# Patient Record
Sex: FEMALE | Race: ASIAN | NOT HISPANIC OR LATINO | Employment: FULL TIME | URBAN - METROPOLITAN AREA
[De-identification: names, ages, dates, MRNs, and addresses within clinical notes are randomized per-mention and may not be internally consistent; named-entity substitution may affect disease eponyms.]

---

## 2017-10-16 ENCOUNTER — OFFICE VISIT (OUTPATIENT)
Dept: FAMILY MEDICINE | Facility: CLINIC | Age: 27
End: 2017-10-16
Payer: COMMERCIAL

## 2017-10-16 VITALS
OXYGEN SATURATION: 100 % | HEIGHT: 59 IN | SYSTOLIC BLOOD PRESSURE: 99 MMHG | TEMPERATURE: 97.9 F | DIASTOLIC BLOOD PRESSURE: 64 MMHG | WEIGHT: 104 LBS | HEART RATE: 73 BPM | BODY MASS INDEX: 20.96 KG/M2

## 2017-10-16 DIAGNOSIS — Z12.4 SCREENING FOR MALIGNANT NEOPLASM OF CERVIX: ICD-10-CM

## 2017-10-16 DIAGNOSIS — R14.1 FLATULENCE, ERUCTATION, AND GAS PAIN: ICD-10-CM

## 2017-10-16 DIAGNOSIS — R14.3 FLATULENCE, ERUCTATION, AND GAS PAIN: ICD-10-CM

## 2017-10-16 DIAGNOSIS — N94.6 DYSMENORRHEA: ICD-10-CM

## 2017-10-16 DIAGNOSIS — R14.2 FLATULENCE, ERUCTATION, AND GAS PAIN: ICD-10-CM

## 2017-10-16 DIAGNOSIS — Z00.01 ENCOUNTER FOR PREVENTATIVE ADULT HEALTH CARE EXAM WITH ABNORMAL FINDINGS: Primary | ICD-10-CM

## 2017-10-16 DIAGNOSIS — Z23 NEED FOR PROPHYLACTIC VACCINATION WITH TETANUS-DIPHTHERIA (TD): ICD-10-CM

## 2017-10-16 PROCEDURE — 99385 PREV VISIT NEW AGE 18-39: CPT | Mod: 25 | Performed by: FAMILY MEDICINE

## 2017-10-16 PROCEDURE — G0145 SCR C/V CYTO,THINLAYER,RESCR: HCPCS | Performed by: FAMILY MEDICINE

## 2017-10-16 PROCEDURE — 90471 IMMUNIZATION ADMIN: CPT | Performed by: FAMILY MEDICINE

## 2017-10-16 PROCEDURE — 90472 IMMUNIZATION ADMIN EACH ADD: CPT | Performed by: FAMILY MEDICINE

## 2017-10-16 PROCEDURE — 90715 TDAP VACCINE 7 YRS/> IM: CPT | Performed by: FAMILY MEDICINE

## 2017-10-16 PROCEDURE — 90686 IIV4 VACC NO PRSV 0.5 ML IM: CPT | Performed by: FAMILY MEDICINE

## 2017-10-16 NOTE — PROGRESS NOTES
SUBJECTIVE:   CC: Hardy Head is an 27 year old woman who presents for preventive health visit.     Physical   Annual:     Getting at least 3 servings of Calcium per day::  Yes    Bi-annual eye exam::  Yes    Dental care twice a year::  Yes    Sleep apnea or symptoms of sleep apnea::  None    Diet::  Regular (no restrictions)    Frequency of exercise::  1 day/week    Duration of exercise::  Other    Taking medications regularly::  Not Applicable    Additional concerns today::  No      Painful periods.  9/21/17, 8/21/17, 7/16/17, Mena nothing, 5/27/17, 4/27/17, 3/18/17  Can get dysmenorrhea with menstrual cycle, intermittent.  Not considering pregnancy at this time.    Constipation, anal fissure in past.   Stool softener prn.  Has daily bowel movements.   Notices bloating with rice  Burping after lunch.  This has been going on for awhile.  No problems after breakfast or dinner.    Can have trouble with insomnia when mind is racing    Low back pain with periods.          Today's PHQ-2 Score:   PHQ-2 ( 1999 Pfizer) 10/16/2017   Q1: Little interest or pleasure in doing things 0   Q2: Feeling down, depressed or hopeless 0   PHQ-2 Score 0   Q1: Little interest or pleasure in doing things Not at all   Q2: Feeling down, depressed or hopeless Not at all   PHQ-2 Score 0       Abuse: Current or Past(Physical, Sexual or Emotional)- No  Do you feel safe in your environment - Yes    Social History   Substance Use Topics     Smoking status: Never Smoker     Smokeless tobacco: Never Used     Alcohol use No     The patient does not drink >3 drinks per day nor >7 drinks per week.    Reviewed orders with patient.  Reviewed health maintenance and updated orders accordingly - Yes  There is no problem list on file for this patient.    Past Surgical History:   Procedure Laterality Date     NO HISTORY OF SURGERY         Social History   Substance Use Topics     Smoking status: Never Smoker     Smokeless tobacco: Never Used     Alcohol  "use No     History reviewed. No pertinent family history.          Mammogram not appropriate for this patient based on age.    Pertinent mammograms are reviewed under the imaging tab.  History of abnormal Pap smear: NO - age 21-29 PAP every 3 years recommended    Reviewed and updated as needed this visit by clinical staffTobacco  Allergies  Med Hx  Surg Hx  Fam Hx  Soc Hx        Reviewed and updated as needed this visit by Provider  Tobacco  Soc Hx             ROS:  C: NEGATIVE for fever, chills, change in weight  I: NEGATIVE for worrisome rashes, moles or lesions  E: NEGATIVE for vision changes or irritation  ENT: NEGATIVE for ear, mouth and throat problems  R: NEGATIVE for significant cough or SOB  B: NEGATIVE for masses, tenderness or discharge  CV: NEGATIVE for chest pain, palpitations or peripheral edema  GI: see above  : NEGATIVE for unusual urinary or vaginal symptoms. Periods are regular.   female: see above  M: NEGATIVE for significant arthralgias or myalgia  N: NEGATIVE for weakness, dizziness or paresthesias  P: NEGATIVE for changes in mood or affect     OBJECTIVE:   BP 99/64  Pulse 73  Temp 97.9  F (36.6  C) (Oral)  Ht 4' 11.37\" (1.508 m)  Wt 104 lb (47.2 kg)  LMP 09/21/2017 (Exact Date)  SpO2 100%  BMI 20.74 kg/m2  EXAM:  GENERAL: healthy, alert and no distress  EYES: Eyes grossly normal to inspection, PERRL and conjunctivae and sclerae normal  HENT: ear canals and TM's normal, nose and mouth without ulcers or lesions  NECK: no adenopathy, no asymmetry, masses, or scars and thyroid normal to palpation  RESP: lungs clear to auscultation - no rales, rhonchi or wheezes  BREAST: normal without masses, tenderness or nipple discharge and no palpable axillary masses or adenopathy  CV: regular rate and rhythm, normal S1 S2, no S3 or S4, no murmur, click or rub, no peripheral edema and peripheral pulses strong  ABDOMEN: soft, nontender, no hepatosplenomegaly, no masses and bowel sounds " "normal  :  Vagina and vulva are normal;  no discharge is noted.  Cervix normal without lesions. Uterus anteverted and mobile, normal in size and shape without tenderness.  Adnexa normal in size without masses or tenderness. Pap Smear - is completed today.  MS: no gross musculoskeletal defects noted, no edema  SKIN: no suspicious lesions or rashes  NEURO: Normal strength and tone, mentation intact and speech normal  PSYCH: mentation appears normal, affect normal/bright    ASSESSMENT/PLAN:   1. Encounter for preventative adult health care exam with abnormal findings    - FLU VAC, SPLIT VIRUS IM > 3 YO (QUADRIVALENT) [01791]  - Vaccine Administration, Initial [48262]  - Vaccine Administration, Each Additional [51242]    2. Flatulence, eructation, and gas pain  Patient instructions printed for her review regarding common reasons for this  Follow up with me if persistent    3. Dysmenorrhea  May use nsaids as needed  Symptoms are not monthly  reassurance    4. Need for prophylactic vaccination with tetanus-diphtheria (TD)    - TDAP VACCINE (ADACEL)    5. Screening for malignant neoplasm of cervix    - Pap imaged thin layer screen reflex to HPV if ASCUS - recommend age 25 - 29    COUNSELING:  Reviewed preventive health counseling, as reflected in patient instructions       Regular exercise       Healthy diet/nutrition       Contraception       Family planning         reports that she has never smoked. She has never used smokeless tobacco.    Estimated body mass index is 20.74 kg/(m^2) as calculated from the following:    Height as of this encounter: 4' 11.37\" (1.508 m).    Weight as of this encounter: 104 lb (47.2 kg).         Counseling Resources:  ATP IV Guidelines  Pooled Cohorts Equation Calculator  Breast Cancer Risk Calculator  FRAX Risk Assessment  ICSI Preventive Guidelines  Dietary Guidelines for Americans, 2010  USDA's MyPlate  ASA Prophylaxis  Lung CA Screening    Leann Virk MD  [facility]" BRIGHTONInjectable Influenza Immunization Documentation    1.  Is the person to be vaccinated sick today?   No    2. Does the person to be vaccinated have an allergy to a component   of the vaccine?   No    3. Has the person to be vaccinated ever had a serious reaction   to influenza vaccine in the past?   No    4. Has the person to be vaccinated ever had Guillain-Barré syndrome?   No    Form completed by Self

## 2017-10-16 NOTE — LETTER
October 20, 2017      Hardy Head  [address]  [place] [address]    Dear ,      I am happy to inform you that your recent cervical cancer screening test (PAP smear) was normal.      Preventative screenings such as this help to ensure your health for years to come. You should repeat a pap smear in 3 years, unless otherwise directed.      You will still need to return to the clinic every year for your annual exam and other preventive tests.     Please contact the clinic at [phone] if you have further questions.       Sincerely,      Leann Virk MD/[facility]

## 2017-10-16 NOTE — MR AVS SNAPSHOT
After Visit Summary   10/16/2017    Hardy Head    MRN: [id number]           Patient Information     Date Of Birth          1990        Visit Information        Provider Department      10/16/2017 6:00 PM Leann Virk MD [facility]        Today's Diagnoses     Encounter for preventative adult health care exam with abnormal findings    -  1    Flatulence, eructation, and gas pain        Dysmenorrhea        Need for prophylactic vaccination with tetanus-diphtheria (TD)        Screening for malignant neoplasm of cervix          Care Instructions        Preventive Health Recommendations  Female Ages 26 - 39  Yearly exam:   See your health care provider every year in order to    Review health changes.     Discuss preventive care.      Review your medicines if you your doctor has prescribed any.    Until age 30: Get a Pap test every three years (more often if you have had an abnormal result).    After age 30: Talk to your doctor about whether you should have a Pap test every 3 years or have a Pap test with HPV screening every 5 years.   You do not need a Pap test if your uterus was removed (hysterectomy) and you have not had cancer.  You should be tested each year for STDs (sexually transmitted diseases), if you're at risk.   Talk to your provider about how often to have your cholesterol checked.  If you are at risk for diabetes, you should have a diabetes test (fasting glucose).  Shots: Get a flu shot each year. Get a tetanus shot every 10 years.   Nutrition:     Eat at least 5 servings of fruits and vegetables each day.    Eat whole-grain bread, whole-wheat pasta and brown rice instead of white grains and rice.    Talk to your provider about Calcium and Vitamin D.     Lifestyle    Exercise at least 150 minutes a week (30 minutes a day, 5 days of the week). This will help you control your weight and prevent disease.    Limit alcohol to one drink per day.    No smoking.  "    Wear sunscreen to prevent skin cancer.    See your dentist every six months for an exam and cleaning.            Follow-ups after your visit        Who to contact     If you have questions or need follow up information about today's clinic visit or your schedule please contact [facility] directly at [phone].  Normal or non-critical lab and imaging results will be communicated to you by MyChart, letter or phone within 4 business days after the clinic has received the results. If you do not hear from us within 7 days, please contact the clinic through MyChart or phone. If you have a critical or abnormal lab result, we will notify you by phone as soon as possible.  Submit refill requests through [employer] or call your pharmacy and they will forward the refill request to us. Please allow 3 business days for your refill to be completed.          Additional Information About Your Visit        MyChart Information     [employer] lets you send messages to your doctor, view your test results, renew your prescriptions, schedule appointments and more. To sign up, go to [email or web address] . Click on \"Log in\" on the left side of the screen, which will take you to the Welcome page. Then click on \"Sign up Now\" on the right side of the page.     You will be asked to enter the access code listed below, as well as some personal information. Please follow the directions to create your username and password.     Your access code is: [email or web address]  Expires: 2018  7:18 PM     Your access code will  in 90 days. If you need help or a new code, please call your [facility] or [phone].        Care EveryWhere ID     This is your Care EveryWhere ID. This could be used by other organizations to access your [place] medical records  [id number]        Your Vitals Were     Pulse Temperature Height Last Period Pulse Oximetry BMI (Body Mass Index)    73 97.9  F (36.6  C) (Oral) 4' 11.37\" (1.508 m) " 09/21/2017 (Exact Date) 100% 20.74 kg/m2       Blood Pressure from Last 3 Encounters:   10/16/17 99/64   07/13/16 101/68    Weight from Last 3 Encounters:   10/16/17 104 lb (47.2 kg)              We Performed the Following     FLU VAC, SPLIT VIRUS IM > 3 YO (QUADRIVALENT) [09732]     Pap imaged thin layer screen reflex to HPV if ASCUS - recommend age 25 - 29     TDAP VACCINE (ADACEL)     Vaccine Administration, Each Additional [25559]     Vaccine Administration, Initial [09633]        Primary Care Provider    None Specified       No primary provider on file.        Equal Access to Services     [facility] : Hadtrang Vega, chrystal can, bronson delacruz, behzad lang . [facility] [phone].    ATENCIÓN: Si habla español, tiene a benson disposición servicios gratuitos de asistencia lingüística. Llame al [phone].    We comply with applicable federal civil rights laws and [place] laws. We do not discriminate on the basis of race, color, national origin, age, disability, sex, sexual orientation, or gender identity.            Thank you!     Thank you for choosing [facility]  for your care. Our goal is always to provide you with excellent care. Hearing back from our patients is one way we can continue to improve our services. Please take a few minutes to complete the written survey that you may receive in the mail after your visit with us. Thank you!             Your Updated Medication List - Protect others around you: Learn how to safely use, store and throw away your medicines at www.disposemymeds.org.          This list is accurate as of: 10/16/17  7:19 PM.  Always use your most recent med list.                   Brand Name Dispense Instructions for use Diagnosis    acetaminophen-caff-pyrilamine 500-60-15 MG Tabs per tablet    MIDOL MENSTRUAL     Take 2 tablets by mouth every 6 hours as needed for mild pain        ZYRTEC ALLERGY PO

## 2017-10-16 NOTE — PATIENT INSTRUCTIONS

## 2017-10-19 LAB
COPATH REPORT: NORMAL
PAP: NORMAL

## 2018-03-14 ENCOUNTER — OFFICE VISIT (OUTPATIENT)
Dept: FAMILY MEDICINE | Facility: CLINIC | Age: 28
End: 2018-03-14
Payer: COMMERCIAL

## 2018-03-14 VITALS
HEART RATE: 97 BPM | SYSTOLIC BLOOD PRESSURE: 98 MMHG | TEMPERATURE: 97 F | DIASTOLIC BLOOD PRESSURE: 64 MMHG | OXYGEN SATURATION: 98 % | WEIGHT: 104 LBS | BODY MASS INDEX: 20.74 KG/M2

## 2018-03-14 DIAGNOSIS — J30.2 CHRONIC SEASONAL ALLERGIC RHINITIS, UNSPECIFIED TRIGGER: Primary | ICD-10-CM

## 2018-03-14 DIAGNOSIS — Z13.6 CARDIOVASCULAR SCREENING; LDL GOAL LESS THAN 130: ICD-10-CM

## 2018-03-14 DIAGNOSIS — R14.3 FLATULENCE, ERUCTATION, AND GAS PAIN: ICD-10-CM

## 2018-03-14 DIAGNOSIS — R14.2 FLATULENCE, ERUCTATION, AND GAS PAIN: ICD-10-CM

## 2018-03-14 DIAGNOSIS — R14.1 FLATULENCE, ERUCTATION, AND GAS PAIN: ICD-10-CM

## 2018-03-14 DIAGNOSIS — Z83.3 FAMILY HISTORY OF DIABETES MELLITUS: ICD-10-CM

## 2018-03-14 LAB
BASOPHILS # BLD AUTO: 0 10E9/L (ref 0–0.2)
BASOPHILS NFR BLD AUTO: 0.7 %
CHOLEST SERPL-MCNC: 215 MG/DL
DIFFERENTIAL METHOD BLD: NORMAL
EOSINOPHIL # BLD AUTO: 0.5 10E9/L (ref 0–0.7)
EOSINOPHIL NFR BLD AUTO: 7.8 %
ERYTHROCYTE [DISTWIDTH] IN BLOOD BY AUTOMATED COUNT: 12.5 % (ref 10–15)
GLUCOSE SERPL-MCNC: 96 MG/DL (ref 70–99)
HCT VFR BLD AUTO: 39.6 % (ref 35–47)
HDLC SERPL-MCNC: 62 MG/DL
HGB BLD-MCNC: 13.3 G/DL (ref 11.7–15.7)
LDLC SERPL CALC-MCNC: 130 MG/DL
LYMPHOCYTES # BLD AUTO: 2.3 10E9/L (ref 0.8–5.3)
LYMPHOCYTES NFR BLD AUTO: 37.8 %
MCH RBC QN AUTO: 29.1 PG (ref 26.5–33)
MCHC RBC AUTO-ENTMCNC: 33.6 G/DL (ref 31.5–36.5)
MCV RBC AUTO: 87 FL (ref 78–100)
MONOCYTES # BLD AUTO: 0.8 10E9/L (ref 0–1.3)
MONOCYTES NFR BLD AUTO: 12.7 %
NEUTROPHILS # BLD AUTO: 2.5 10E9/L (ref 1.6–8.3)
NEUTROPHILS NFR BLD AUTO: 41 %
NONHDLC SERPL-MCNC: 153 MG/DL
PLATELET # BLD AUTO: 362 10E9/L (ref 150–450)
RBC # BLD AUTO: 4.57 10E12/L (ref 3.8–5.2)
TRIGL SERPL-MCNC: 116 MG/DL
WBC # BLD AUTO: 6.1 10E9/L (ref 4–11)

## 2018-03-14 PROCEDURE — 99214 OFFICE O/P EST MOD 30 MIN: CPT | Performed by: FAMILY MEDICINE

## 2018-03-14 PROCEDURE — 83516 IMMUNOASSAY NONANTIBODY: CPT | Mod: 59 | Performed by: FAMILY MEDICINE

## 2018-03-14 PROCEDURE — 85025 COMPLETE CBC W/AUTO DIFF WBC: CPT | Performed by: FAMILY MEDICINE

## 2018-03-14 PROCEDURE — 36415 COLL VENOUS BLD VENIPUNCTURE: CPT | Performed by: FAMILY MEDICINE

## 2018-03-14 PROCEDURE — 82947 ASSAY GLUCOSE BLOOD QUANT: CPT | Performed by: FAMILY MEDICINE

## 2018-03-14 PROCEDURE — 80061 LIPID PANEL: CPT | Performed by: FAMILY MEDICINE

## 2018-03-14 PROCEDURE — 83516 IMMUNOASSAY NONANTIBODY: CPT | Performed by: FAMILY MEDICINE

## 2018-03-14 RX ORDER — MONTELUKAST SODIUM 10 MG/1
10 TABLET ORAL AT BEDTIME
Qty: 30 TABLET | Refills: 1 | Status: SHIPPED | OUTPATIENT
Start: 2018-03-14

## 2018-03-14 NOTE — PROGRESS NOTES
SUBJECTIVE:   Hardy Head is a 27 year old female who presents to clinic today for the following health issues:    ALLERGIES    Cough spells, sneezing, runny nose, no SOB or wheezing.    Nasal plugging.    Gets this every year: April through May.    She is taking Zyrtec and not sure whether helping.      Duration: 1 week    Description:   Nasal congestion: YES  Sneezing: YES  Red, itchy eyes: no    Accompanying signs and symptoms: coughing    History (similar episodes/allergy testing): Has been going on every year around this time    Precipitating or alleviating factors: None    Therapies tried and outcome: Zyrtec ; unknown if working    Abdominal Bloating:   Usually occurs after eating and has a lot of gas  Had chronic constipation about 3-4 yrs ago; was given given laxative and changed her diet and getting better.  Bloating makes her feel sick; occurs especially with rice. Diary products do not bother her.    FHx DM:   Grandfather, uncle dad    Problem list and histories reviewed & adjusted, as indicated.  Additional history: as documented    There is no problem list on file for this patient.    Past Surgical History:   Procedure Laterality Date     NO HISTORY OF SURGERY         Social History   Substance Use Topics     Smoking status: Never Smoker     Smokeless tobacco: Never Used     Alcohol use No     No family history on file.        Reviewed and updated as needed this visit by clinical staff    ROS:  Constitutional, HEENT, cardiovascular, pulmonary, gi and gu systems are negative, except as otherwise noted.    OBJECTIVE:     BP 98/64  Pulse 97  Temp 97  F (36.1  C) (Oral)  Wt 104 lb (47.2 kg)  SpO2 98%  BMI 20.74 kg/m2  Body mass index is 20.74 kg/(m^2).  GENERAL: healthy, alert and no distress  ENT: Nasal congestion   NECK: no adenopathy and thyroid normal to palpation  RESP: lungs clear to auscultation - no rales, rhonchi or wheezes  CV: regular rate and rhythm, normal S1 S2, no S3 or S4, no  murmur, click or rub, no peripheral edema   ABDOMEN: soft, nontender, no masses and bowel sounds normal  MS: no gross musculoskeletal defects noted, no edema    Diagnostic Test Results:  none     ASSESSMENT/PLAN:     (J30.2) Chronic seasonal allergic rhinitis, unspecified trigger  (primary encounter diagnosis)  Comment: Discussed the nature of seasonal allergies on the retry Singulair given that Zyrtec has not given her adequate relief.  Also discussed following up with an allergy specialist to see if she needs an testing and for further recommendations.  Plan: montelukast (SINGULAIR) 10 MG tablet,         ALLERGY/ASTHMA ADULT REFERRAL    (R14.3,  R14.1,  R14.2) Flatulence, eructation, and gas pain  Comment: Will check for gluten sensitivity given that this gets worse with eating rice.  Plan: Tissue transglutaminase christiano IgA and IgG, CBC         with platelets differential    (Z13.6) CARDIOVASCULAR SCREENING; LDL GOAL LESS THAN 130  Comment: This is fasting we will also do a cholesterol check  Plan: Lipid panel reflex to direct LDL Fasting    (Z83.3) Family history of diabetes mellitus  Comment: Given strong family history of diabetes we will check a glucose level today  Plan: Glucose    Follow up in 1 month or sooner with concerns     Nirav Lundberg MD  [facility]

## 2018-03-14 NOTE — PATIENT INSTRUCTIONS
Seasonal Allergy  Seasonal allergy is also called hay fever. It may occur after a person is exposed to pollens released from grasses, weeds, trees and shrubs. This type of allergy occurs during the spring and summer when the pollen contacts the lining of the nose, eyes, eyelids, sinuses and throat. This causes histamine to be released from the tissues. Histamine causes itching and swelling. This may produce a watery discharge from the eyes or nose. Violent sneezing, nasal congestion, post-nasal drip, itching of the eyes, nose, throat and mouth, scratchy throat, and dry cough may also occur.  Home care  Seasonal allergy cannot be cured, but symptoms can be reduced by these measures:    Avoid or reduce exposure to the allergen as much as you can:      Stay indoors on windy days of pollen season.     Keep windows and doors closed. Use air conditioning instead in your home and car. This filters the air.    Change air conditioner filters often.    Take a shower, wash your hair, and change clothes after being outdoors.    Put on a NIOSH-rated 95 filter mask when working outdoors. Before going outside, take your allergy medicine as advised by your healthcare provider.    Decongestant pills and sprays reduce tissue swelling and watery discharge. Overuse of nasal decongestant sprays may make symptoms worse. Do not use these more often than recommended. Sometimes you can experience a rebound effect (symptoms worsen), when stopping them. Talk to your healthcare provider or pharmacist about these medicines before taking them, especially if you have high blood pressure or heart problems.     Antihistamines block the release of histamine during the allergic response. They work better when taken before symptoms develop. Unless a prescription antihistamine was prescribed, you can take over-the-counter antihistamines that do not cause drowsiness.  Ask your pharmacist for suggestions.    Steroid nasal sprays or oral steroids may  also be prescribed for more severe symptoms. These help to reduce the local inflammation that can add to the allergic response.    If you have asthma, pollen season may make your asthma symptoms worse. It is important that you use your asthma medicines as directed during this time to prevent or treat attacks. Some persons with asthma have asthma symptoms that get worse when they take antihistamines. This is due to the drying effect on the lungs. If you notice this, stop the antihistamines, drink extra fluids and notify your doctor.    If you have sinus congestion or drainage, a saline nasal rinse may give relief. A saline nasal rinse lessens the swelling and clears excess mucus. This allows sinuses to drain. Prepackaged kits are sold at most drug stores. These contain pre-mixed salt packets and an irrigation device.  Follow-up care  Follow up with your healthcare provider or as directed. If you have been referred to a specialist, make an appointment promptly.  When to seek medical advice  Call your healthcare provider for any of the following:    Facial, ear or sinus pain; colored drainage from the nose    Headaches    You have asthma and your asthma symptoms do not respond to the usual doses of your medicine    Cough with colored sputum (mucus)    Fever of 100.4 F (38 C) or higher, or as directed by the healthcare provider  Call 911 if any of these occur:    Trouble breathing or swallowing, wheezing    Hoarse voice, trouble speaking, or drooling    Confusion    Very drowsy or trouble awakening    Fainting or loss of consciousness    Rapid heart rate, or weak pulse    Low blood pressure    Feeling of doom    Nausea, vomiting, abdominal pain, diarrhea    Vomiting blood, or large amounts of blood in stool    Seizure    Cold, moist, or pale (blue in color) skin  Date Last Reviewed: 5/1/2017 2000-2017 [employer]. [address] [place] [address], [place] [address]. All rights reserved. This information is not  intended as a substitute for professional medical care. Always follow your healthcare professional's instructions.      [facility]    If you have any questions regarding to your visit please contact your care team:       Team [facility]:   Clinic Hours Telephone Number   Dr. Sarika Engel   7am-7pm  Monday - Thursday   7am-5pm  Fridays  [phone]  (Appointment scheduling available 24/7)    Questions about your Visit?   Team Line:  [phone]   Urgent Care - [place] - 11am-9pm Monday-Friday Saturday-Sunday- 9am-5pm   [place] - 5pm-9pm Monday-Friday Saturday-Sunday- 9am-5pm  [phone] - [facility]  [phone] - [place]       What options do I have for visits at the clinic other than the traditional office visit?  To expand how we care for you, many of our providers are utilizing electronic visits (e-visits) and telephone visits, when medically appropriate, for interactions with their patients rather than a visit in the clinic.   We also offer nurse visits for many medical concerns. Just like any other service, we will bill your insurance company for this type of visit based on time spent on the phone with your provider. Not all insurance companies cover these visits. Please check with your medical insurance if this type of visit is covered. You will be responsible for any charges that are not paid by your insurance.      E-visits via [employer]:  generally incur a $35.00 fee.  Telephone visits:  Time spent on the phone: *charged based on time that is spent on the phone in increments of 10 minutes. Estimated cost:   5-10 mins $30.00   11-20 mins. $59.00   21-30 mins. $85.00     Use [employer] (secure email communication and access to your chart) to send your primary care provider a message or make an appointment. Ask someone on your Team how to sign up for [employer].  For a Price Quote for your services, please  call our Consumer Price Line at [phone].  As always, Thank you for trusting us with your health care needs!    Discharged By: An

## 2018-03-14 NOTE — MR AVS SNAPSHOT
After Visit Summary   3/14/2018    Hardy Head    MRN: [id number]           Patient Information     Date Of Birth          1990        Visit Information        Provider Department      3/14/2018 7:20 AM Nirav Lundberg MD [facility]        Today's Diagnoses     Chronic seasonal allergic rhinitis, unspecified trigger    -  1    Flatulence, eructation, and gas pain        CARDIOVASCULAR SCREENING; LDL GOAL LESS THAN 130        Family history of diabetes mellitus          Care Instructions      Seasonal Allergy  Seasonal allergy is also called hay fever. It may occur after a person is exposed to pollens released from grasses, weeds, trees and shrubs. This type of allergy occurs during the spring and summer when the pollen contacts the lining of the nose, eyes, eyelids, sinuses and throat. This causes histamine to be released from the tissues. Histamine causes itching and swelling. This may produce a watery discharge from the eyes or nose. Violent sneezing, nasal congestion, post-nasal drip, itching of the eyes, nose, throat and mouth, scratchy throat, and dry cough may also occur.  Home care  Seasonal allergy cannot be cured, but symptoms can be reduced by these measures:    Avoid or reduce exposure to the allergen as much as you can:      Stay indoors on windy days of pollen season.     Keep windows and doors closed. Use air conditioning instead in your home and car. This filters the air.    Change air conditioner filters often.    Take a shower, wash your hair, and change clothes after being outdoors.    Put on a NIOSH-rated 95 filter mask when working outdoors. Before going outside, take your allergy medicine as advised by your healthcare provider.    Decongestant pills and sprays reduce tissue swelling and watery discharge. Overuse of nasal decongestant sprays may make symptoms worse. Do not use these more often than recommended. Sometimes you can experience a rebound  effect (symptoms worsen), when stopping them. Talk to your healthcare provider or pharmacist about these medicines before taking them, especially if you have high blood pressure or heart problems.     Antihistamines block the release of histamine during the allergic response. They work better when taken before symptoms develop. Unless a prescription antihistamine was prescribed, you can take over-the-counter antihistamines that do not cause drowsiness.  Ask your pharmacist for suggestions.    Steroid nasal sprays or oral steroids may also be prescribed for more severe symptoms. These help to reduce the local inflammation that can add to the allergic response.    If you have asthma, pollen season may make your asthma symptoms worse. It is important that you use your asthma medicines as directed during this time to prevent or treat attacks. Some persons with asthma have asthma symptoms that get worse when they take antihistamines. This is due to the drying effect on the lungs. If you notice this, stop the antihistamines, drink extra fluids and notify your doctor.    If you have sinus congestion or drainage, a saline nasal rinse may give relief. A saline nasal rinse lessens the swelling and clears excess mucus. This allows sinuses to drain. Prepackaged kits are sold at most drug stores. These contain pre-mixed salt packets and an irrigation device.  Follow-up care  Follow up with your healthcare provider or as directed. If you have been referred to a specialist, make an appointment promptly.  When to seek medical advice  Call your healthcare provider for any of the following:    Facial, ear or sinus pain; colored drainage from the nose    Headaches    You have asthma and your asthma symptoms do not respond to the usual doses of your medicine    Cough with colored sputum (mucus)    Fever of 100.4 F (38 C) or higher, or as directed by the healthcare provider  Call 911 if any of these occur:    Trouble breathing or  swallowing, wheezing    Hoarse voice, trouble speaking, or drooling    Confusion    Very drowsy or trouble awakening    Fainting or loss of consciousness    Rapid heart rate, or weak pulse    Low blood pressure    Feeling of doom    Nausea, vomiting, abdominal pain, diarrhea    Vomiting blood, or large amounts of blood in stool    Seizure    Cold, moist, or pale (blue in color) skin  Date Last Reviewed: 5/1/2017 2000-2017 [employer]. [address] [place] [address]. All rights reserved. This information is not intended as a substitute for professional medical care. Always follow your healthcare professional's instructions.      [facility]    If you have any questions regarding to your visit please contact your care team:       Team Purple:   Clinic Hours Telephone Number   Dr. Sarika Engel   7am-7pm  Monday - Thursday   7am-5pm  Fridays  [phone]  (Appointment scheduling available 24/7)    Questions about your Visit?   Team Line:  [phone]   [facility] [place] [facility] - 11am-9pm Monday-Friday Saturday-Sunday- 9am-5pm   [place] - 5pm-9pm Monday-Friday Saturday-Sunday- 9am-5pm  [phone] - [place]   [phone] - [place]       What options do I have for visits at the clinic other than the traditional office visit?  To expand how we care for you, many of our providers are utilizing electronic visits (e-visits) and telephone visits, when medically appropriate, for interactions with their patients rather than a visit in the clinic.   We also offer nurse visits for many medical concerns. Just like any other service, we will bill your insurance company for this type of visit based on time spent on the phone with your provider. Not all insurance companies cover these visits. Please check with your medical insurance if this type of visit is covered. You will be responsible for  any charges that are not paid by your insurance.      E-visits via [employer]:  generally incur a $35.00 fee.  Telephone visits:  Time spent on the phone: *charged based on time that is spent on the phone in increments of 10 minutes. Estimated cost:   5-10 mins $30.00   11-20 mins. $59.00   21-30 mins. $85.00     Use [employer] (secure email communication and access to your chart) to send your primary care provider a message or make an appointment. Ask someone on your Team how to sign up for [employer].  For a Price Quote for your services, please call our [employer] Line at [phone].  As always, Thank you for trusting us with your health care needs!    Discharged By: An            Follow-ups after your visit        Additional Services     ALLERGY/ASTHMA ADULT REFERRAL       Your provider has referred you to: [facility] Daron Moran [phone]  [email or web address]    Please be aware that coverage of these services is subject to the terms and limitations of your health insurance plan.  Call member services at your health plan with any benefit or coverage questions.      Please bring the following to your appointment:  >>   Any x-rays, CTs or MRIs which have been performed.  Contact the facility where they were done to arrange for  prior to your scheduled appointment.  Any new CT, MRI or other procedures ordered by your specialist must be performed at a [facility] facility or coordinated by your clinic's referral office.  >>   List of current medications  >>   This referral request   >>   Any documents/labs given to you for this referral                  Who to contact     If you have questions or need follow up information about today's clinic visit or your schedule please contact [facility] directly at [phone].  Normal or non-critical lab and imaging results will be communicated to you by MyChart, letter or phone within 4 business days after the clinic  has received the results. If you do not hear from us within 7 days, please contact the clinic through [employer] or phone. If you have a critical or abnormal lab result, we will notify you by phone as soon as possible.  Submit refill requests through [employer] or call your pharmacy and they will forward the refill request to us. Please allow 3 business days for your refill to be completed.          Additional Information About Your Visit        [employer] Information     [employer] gives you secure access to your electronic health record. If you see a primary care provider, you can also send messages to your care team and make appointments. If you have questions, please call your primary care clinic.  If you do not have a primary care provider, please call [phone] and they will assist you.        Care EveryWhere ID     This is your Care EveryWhere ID. This could be used by other organizations to access your [facility] medical records  [id number]        Your Vitals Were     Pulse Temperature Pulse Oximetry BMI (Body Mass Index)          97 97  F (36.1  C) (Oral) 98% 20.74 kg/m2         Blood Pressure from Last 3 Encounters:   03/14/18 98/64   10/16/17 99/64   07/13/16 101/68    Weight from Last 3 Encounters:   03/14/18 104 lb (47.2 kg)   10/16/17 104 lb (47.2 kg)              We Performed the Following     ALLERGY/ASTHMA ADULT REFERRAL     CBC with platelets differential     Glucose     Lipid panel reflex to direct LDL Fasting     Tissue transglutaminase christiano IgA and IgG          Today's Medication Changes          These changes are accurate as of 3/14/18  8:24 AM.  If you have any questions, ask your nurse or doctor.               Start taking these medicines.        Dose/Directions    montelukast 10 MG tablet   Commonly known as:  SINGULAIR   Used for:  Chronic seasonal allergic rhinitis, unspecified trigger   Started by:  Nirav Lundberg MD        Dose:  10 mg   Take 1 tablet (10 mg) by mouth At Bedtime    Quantity:  30 tablet   Refills:  1            Where to get your medicines      These medications were sent to [facility] [address] [place] [address]  [address] [facility], [facility] [address]     Phone:  [phone]     montelukast 10 MG tablet                Primary Care Provider Office Phone # Fax #    [facility] [phone]       [address] [facility] [address]        Equal Access to Services     ANNELISE VILLASENOR : Hadii aad ku hadasho Soomaali, waaxda luqadaha, qaybta kaalmada adeegyada, waxay idiin hayaan adeeg kharash la'quinton mckeon. So [facility] [phone].    ATENCIÓN: Si habla español, tiene a benson disposición servicios gratuitos de asistencia lingüística. [facility] [phone].    We comply with applicable federal civil rights laws and [place] laws. We do not discriminate on the basis of race, color, national origin, age, disability, sex, sexual orientation, or gender identity.            Thank you!     Thank you for choosing [facility]  for your care. Our goal is always to provide you with excellent care. Hearing back from our patients is one way we can continue to improve our services. Please take a few minutes to complete the written survey that you may receive in the mail after your visit with us. Thank you!             Your Updated Medication List - Protect others around you: Learn how to safely use, store and throw away your medicines at www.disposemymeds.org.          This list is accurate as of 3/14/18  8:24 AM.  Always use your most recent med list.                   Brand Name Dispense Instructions for use Diagnosis    acetaminophen-caff-pyrilamine 500-60-15 MG Tabs per tablet    MIDOL MENSTRUAL     Take 2 tablets by mouth every 6 hours as needed for mild pain        montelukast 10 MG tablet    SINGULAIR    30 tablet    Take 1 tablet (10 mg) by mouth At Bedtime    Chronic seasonal allergic rhinitis, unspecified trigger       ZYRTEC  ALLERGY PO

## 2018-03-15 LAB
TTG IGA SER-ACNC: 1 U/ML
TTG IGG SER-ACNC: <1 U/ML

## 2018-03-20 ENCOUNTER — OFFICE VISIT (OUTPATIENT)
Dept: ALLERGY | Facility: CLINIC | Age: 28
End: 2018-03-20
Payer: COMMERCIAL

## 2018-03-20 VITALS
HEIGHT: 59 IN | WEIGHT: 104 LBS | BODY MASS INDEX: 20.96 KG/M2 | OXYGEN SATURATION: 99 % | TEMPERATURE: 97 F | HEART RATE: 75 BPM | RESPIRATION RATE: 16 BRPM

## 2018-03-20 DIAGNOSIS — H10.13 ALLERGIC CONJUNCTIVITIS OF BOTH EYES: ICD-10-CM

## 2018-03-20 DIAGNOSIS — R05.9 COUGH: ICD-10-CM

## 2018-03-20 DIAGNOSIS — J30.89 OTHER ALLERGIC RHINITIS: Primary | ICD-10-CM

## 2018-03-20 LAB
FEF 25/75: NORMAL
FEV-1: NORMAL
FEV1/FVC: NORMAL
FVC: NORMAL

## 2018-03-20 PROCEDURE — 86003 ALLG SPEC IGE CRUDE XTRC EA: CPT | Mod: 59 | Performed by: ALLERGY & IMMUNOLOGY

## 2018-03-20 PROCEDURE — 86003 ALLG SPEC IGE CRUDE XTRC EA: CPT | Mod: 90 | Performed by: ALLERGY & IMMUNOLOGY

## 2018-03-20 PROCEDURE — 36415 COLL VENOUS BLD VENIPUNCTURE: CPT | Performed by: ALLERGY & IMMUNOLOGY

## 2018-03-20 PROCEDURE — 99000 SPECIMEN HANDLING OFFICE-LAB: CPT | Performed by: ALLERGY & IMMUNOLOGY

## 2018-03-20 PROCEDURE — 99244 OFF/OP CNSLTJ NEW/EST MOD 40: CPT | Mod: 25 | Performed by: ALLERGY & IMMUNOLOGY

## 2018-03-20 PROCEDURE — 94010 BREATHING CAPACITY TEST: CPT | Performed by: ALLERGY & IMMUNOLOGY

## 2018-03-20 NOTE — PATIENT INSTRUCTIONS
If you have any questions regarding your allergies, asthma, or what we discussed during your visit today please call the allergy clinic or contact us via [employer].    [facility]: [phone]      I recommend that you start taking the singulair (montelukast) once a day. You can take this at the same time as the zyrtec (cetirizine) and these medicines can be taken in the morning or in the evening.      Follow-up in 3 months - sooner if you are not feeling better      These are the billing and diagnosis codes that your insurance company may need to help you determine if allergy shots are covered and what potential out of pocked costs you may have.    Regular Allergy Shots: 97282    Cluster or Rush Accelerated Build Codes: 97751 - rapid desensitization. This could be a single unit or multiple units billed per day depending on the length of your visit.    Allergy Shot Serum: 27726 - the amount of serum in total varies depending on how many allergy shots you will need    Diagnosis Codes:    Allergic Rhinitis Due to Dust Mite or Mold - J30.89  Chronic Allergic Rhinitis Due to Animals - J30.81  Chronic Seasonal Allergic Rhinitis Due to Pollens - J30.1  Allergic Conjunctivitis - H10.13        Typically allergy shots are given once per week for about 6-8 months until you reach the treatment dose. However, there are some ways you can reach your treatment dose faster:    ACCELERATED IMMUNOTHERAPY PATIENT INFORMATION    Immunotherapy is a treatment that alters the patient s immune system so they have less allergy symptoms, use less medications to control symptoms, improved quality of life, and less health care utilization    Accelerated immunotherapy schedules are designed to allow patients to reach their maintenance immunotherapy dose in a shorter time frame than conventional immunotherapy.    Clinical benefit can be reached more rapidly using accelerated immunotherapy.     A lot of patients do not want to start  allergen immunotherapy secondary to the upfront time commitment associated with conventional allergy shots. Rush immunotherapy would allow a patient to be on monthly allergy shots within 6-10 weeks. Cluster immunotherapy would allow the patient to be on monthly injections around 8-9 weeks.     Rush immunotherapy has historically been associated with an increased risk of reactions, however the risk is substantially lowered by only advancing on RUSH immunotherapy day to the mid-yellow vial, using a pre-medication regimen consisting of steroids and antihistamines, and making sure asthma is well controlled the RUSH immunotherapy day. This puts the risk of a systemic reaction similar to conventional immunotherapy. Cluster immunotherapy is similar in the risk of systemic reaction to conventional immunotherapy. The patient would still need to take oral antihistamine on cluster immunotherapy days.    CLUSTER IMMUNOTHERAPY PATIENT INSTRUCTIONS    Asthma medications must be continued and asthma must be well controlled prior to receiving CLUSTER immunotherapy. Immunotherapy should not be given if you are feeling ill.    Other allergy medications may be continued    You should plan to spend approximately 2 hours at the clinic. Please feel free to bring things to occupy your time such as books, work, or computers. You may also wish to bring something to eat as you will not be able to leave the clinic once the procedure has begun.    You will be required to bring an epinephrine auto-injector with you to your CLUSTER immunotherapy appointments and all subsequent allergy shot appointments    You will be required to take the following pre-medication regimen prior  to your CLUSTER immunotherapy appointments  o Medications to be taken 1 day prior to CLUSTER:  - Zyrtec 10mg or Allegra 180mg twice daily  o Medications to be taken the morning of CLUSTER:  - Zyrtec 10mg or Allegra 180mg    RUSH IMMUNOTHERAPY PATIENT INSTRUCTIONS    Asthma  medications must be continued and asthma must be well controlled prior to starting RUSH immunotherapy. Immunotherapy should not be given if you are feeling ill.     Other allergy medications may be continued.     You should plan to spend approximately 7-8 hours at the clinic. Please feel free to bring things to occupy your time such as books, work, or computers. You may also wish to bring something to eat as you will not be able to leave the clinic once the procedure has begun.  You will be observed for 2 hours after receiving last injection on the RUSH immunotherapy day.    You will be required to bring an epinephrine auto-injector with you to your RUS appointment and all subsequent allergy shot appointments.     You will be required to take the following pre-medication regimen prior to your RUSH immunotherapy appointment  o Medications to be taken 2 days prior to RUSH:  - Prednisone 20mg by mouth twice daily  - Zyrtec 10mg or Allegra 180mg twice daily.   - Singulair 10mg by mouth daily.   - Zantac 150mg by mouth twice daily.  o Medications to be taken 1 day prior to RUSH:  - Prednisone 20mg by mouth twice daily  - Zyrtec 10mg or Allegra 180mg twice daily.   - Singulair 10mg by mouth daily.   - Zantac 150mg by mouth twice daily.  o Medications to be taken the morning of RUSH:  - Prednisone 40mg by mouth once.   - Zyrtec 10mg or Allegra 180mg by mouth once.  - Singulair 10mg by mouth once.   - Zantac 300mg by mouth once.       Allergy Shots (Immunotherapy)  What You Need to Know  What are allergy shots?  Allergy shots are used to treat allergic reactions. They are given in the upper arm.  These shots will slowly build your tolerance to the things you are allergic to (such as pollen, mold and animal dander). They reduce the body's allergic response.  What are the benefits of getting allergy shots?  Allergy shots may:    Relieve symptoms long after treatment has ended    Allow you to take less allergy medicine, or  stop taking it altogether    Prevent new allergies in the future    Keep children's allergies from getting worse and turning into asthma    Improve eczema caused by allergies.  The average patient sees a large improvement in his or her symptoms (up to 80%).  Who should have allergy shots?  Allergy shots are helpful when allergies cause:    Asthma    Itchy, watery eyes (allergic conjunctivitis)    Runny nose, sneezing, sore throat and other symptoms (allergic rhinitis)    Severe reaction to insect stings.  For children, it is best to wait until age 5 before starting allergy shots.  How will I feel during and after treatment?  You will have shots every 3 to 14 days for 4 to 8 months. We will increase the dose each time until we reach a level that works for you. After that, you will have the shots less often.     It may take another year for symptoms to improve. Many people improve much sooner.    You will likely have shots for another 3 to 5 years.  Allergy shots can reduce your symptoms a little or lot. Some people find that their allergies go away entirely.   Most people have long-lasting relief after treatment ends. You should see your doctor every year to find out if you need more shots.  What are the risks?  With each allergy shot, there is the risk of allergic reaction. Some reactions are mild. Others can be life threatening and must be treated at once.   Common reactions  It is common to have a mild reaction, such as redness, swelling, pain and itching in the area of the shot. This can occur right away or up to several hours after the shot. Sometimes the reaction moves into the upper arm. Call your doctor if:    The reaction area is more than 2 inches wide.    You still have the reaction the day after the shot.  Severe reactions  The reactions below are rare, but serious. If not treated, they can lead to very low blood pressure and even death. If you have any of these, get help at once.     Hives include a  rash, swelling and itching on more than one part of the body. They may occur within minutes to hours after a shot.    Swelling of any part of the body. For example, you may have swelling of the ears, tongue, lips, throat, intestines, hands or feet. Swelling may affect more than one body part. These reactions could occur within minutes to hours after the shot.    Trouble breathing. You may develop sneezing, runny nose, stuffy nose, cough or asthma symptoms. These reactions can occur within minutes to hours after the shot.    Anaphylactic shock is sudden, severe and may include symptoms such as hives, trouble breathing, stomach pain, feeling faint or dizzy and low blood pressure. It may cause a loss of consciousness and could lead to death. This reaction usually occurs within minutes of the shot but can happen a few hours later. It is very rare.  You might have a severe reaction after the first shot, or it may occur after a series of shots. If you have a reaction, we will treat you right away. In the future, we will change the dose of your allergy shots.  What happens after each shot?  You should wait in the doctor's office for 30 minutes after each shot. If you have a reaction, we may ask you to stay longer. If you cannot wait the 30 minutes, you should not have your allergy shot.  If you have a severe reaction after leaving the doctor's office, use your epinephrine auto-injector (Epi-pen) and go to the nearest emergency room. If treated promptly, severe reactions are rarely life threatening. We will never give an allergy shot unless medical help is nearby in case of emergency.   What else do I need to know?  If you start taking any new medicines  It is not safe to have these shots while taking certain medicines. If any doctor prescribes new medicine for you, please let us know. This includes:    Beta blockers, often used for heart disease    Blood pressure medicine    Medicine for migraine headaches    Glaucoma  medicine.  A note for women  If you get pregnant, tell the office staff at once.   Your doctor will decide how often you should receive shots during pregnancy. We will not increase your dose while you are pregnant.  If you will have shots at another clinic  If you will have your allergy shots at another clinic, you must provide the name and address of the doctor who will give the shots. We will ask you to fill out a form.  If you have any questions or concerns, please speak to your doctor or a member of our staff.   For informational purposes only. Not to replace the advice of your health care provider.   Copyright   2009 [employer]. All rights reserved. [employer] [id number] - REV 07/17.

## 2018-03-20 NOTE — PROGRESS NOTES
Dear Nirav Lundberg MD,    Thank you for referring your patient Hardy Head to the Allergy/Immunology Clinic. Hardy Head was seen in the Allergy Clinic at [facility]. The following are my recommendations regarding her Cough, Allergic Rhinitis and Allergic Conjunctivitis    1. Will obtain in vitro IgE testing to seasonal and perennial aeroallergens  2. Continue cetirizine 10mg daily  3. Begin montelukast 10mg daily  4. Consider addition of fluticasone or triamcinolone nasal sprays  5. Recommend allergen immunotherapy treatment pending lab results  6. Follow-up in 3 months, sooner if needed      Hardy Head is a 27 year old female being seen today in consultation for allergies. She states that every spring she has developed symptoms of itchy and watery eyes, sneezing, rhinorrhea, nasal congestion, throat itching, and cough. These symptoms started about 2 years after she moved to the  and have persisted yearly since then. In the past Hardy has been treated for bronchitis but has never been tested for allergies. This year she states her symptoms began earlier than usual. Typically she takes zyrtec which is helpful but she still has break through symptoms. In early March she was in [place] for vacation and her symptoms were much more intense. Hardy felt better after returning home but her symptoms have started up again. In the past she has tried nasal sprays in addition to the zyrtec but does not recall which ones. She has also used OTC eye drops. Last week she was prescribed singulair but has not yet started this medication.      FAMILY HISTORY:  No known family history of allergies or asthma    Past Medical History:   Diagnosis Date     NO ACTIVE PROBLEMS        Past Surgical History:   Procedure Laterality Date     NO HISTORY OF SURGERY         ENVIRONMENTAL HISTORY: The [place] The home is heated with a forced air. They does not have  central air conditioning. The patient's bedroom is furnished with Indoor plants and carpeting in bedroom.  Pets inside the house include None. There is not history of cockroach or mice infestation. There is/are 0 smokers in the house.  The house does not have a damp basement.     SOCIAL HISTORY:   Hardy is employed in [employer]. She has missed 3 days of school/work due to sickness. She lives with her spouse.  Her spouse works as a .    REVIEW OF SYSTEMS:  General: negative for weight gain. negative for weight loss. negative for changes in sleep.   Eyes: negative for itching. negative for redness. negative for tearing/watering.  Ears: positive  for fullness. negative for hearing loss. negative for dizziness.   Nose: positive  for snoring.negative for changes in smell. negative for drainage.   Throat: negative for hoarseness. negative for sore throat. negative for trouble swallowing.   Lungs: negative for shortness of breath.negative for wheezing. positive  for sputum production.   Cardiovascular: negative for chest pain. negative for swelling of ankles. negative for fast or irregular heartbeat.   Gastrointestinal: negative for nausea. negative for heartburn. negative for acid reflux.   Musculoskeletal: negative for joint pain. negative for joint stiffness. negative for joint swelling.   Neurologic: negative for seizures. negative for fainting. negative for weakness.   Psychiatric: positive  for changes in mood. positive  for anxiety.   Endocrine: negative for cold intolerance. negative for heat intolerance. negative for tremors.   Hematologic: negative for easy bruising. negative for easy bleeding.  Integumentary: negative for rash. negative for scaling. negative for nail changes.       Current Outpatient Prescriptions:      Dextromethorphan Polistirex (COUGH DM PO), , Disp: , Rfl:      Cetirizine HCl (ZYRTEC ALLERGY PO), , Disp: , Rfl:      montelukast (SINGULAIR) 10 MG tablet, Take 1 tablet (10  "mg) by mouth At Bedtime (Patient not taking: Reported on 3/20/2018), Disp: 30 tablet, Rfl: 1     acetaminophen-caff-pyrilamine (MIDOL MENSTRUAL) 500-60-15 MG TABS, Take 2 tablets by mouth every 6 hours as needed for mild pain, Disp: , Rfl:   Immunization History   Administered Date(s) Administered     Influenza Vaccine IM 3yrs+ 4 Valent IIV4 10/16/2017     TDAP Vaccine (Adacel) 10/16/2017     No Known Allergies      EXAM:   Pulse 75  Temp 97  F (36.1  C) (Oral)  Resp 16  Ht 1.51 m (4' 11.45\")  Wt 47.2 kg (104 lb)  SpO2 99%  BMI 20.69 kg/m2  GENERAL APPEARANCE: alert, cooperative and not in distress  SKIN: no rashes, no lesions  HEAD: atraumatic, normocephalic  EYES: lids and lashes normal, conjunctivae and sclerae clear, pupils equal, round, reactive to light, EOM full and intact  ENT: no scars or lesions, nasal exam showed mucosal edema, otoscopy showed external auditory canals clear, tympanic membranes normal, tongue midline and normal, soft palate, uvula, and tonsils normal  NECK: no asymmetry, masses, or scars, supple without significant adenopathy  LUNGS: unlabored respirations, no intercostal retractions or accessory muscle use, clear to auscultation without rales or wheezes  HEART: regular rate and rhythm without murmurs and normal S1 and S2  MUSCULOSKELETAL: no musculoskeletal defects are noted  NEURO: no focal deficits noted  PSYCH: does not appear depressed or anxious    WORKUP: Spirometry  SPIROMETRY       FVC 2.83L (102% of predicted).     FEV1 2.53L (106% of predicted).     FEV1/FVC 89%     FEF 25%-75%  3.31L/s (100% of predicted).    These values are consistent with normal lung function without evidence of airflow obstruciton    ASSESSMENT/PLAN:  Hardy Head is a 27 year old female here for evaluation of allergies and cough. Skin prick testing could not be performed due to recent antihistamine use. Spirometry obtained today was normal. She was counseled regarding management of allergies with " avoidance measures, medications, and immunotherapy treatment. We reviewed the risks, benefits, and anticipated duration of immunotherapy treatment. Hardy is interested in pursuing this as a treatment option given the recurrence of symptoms and lack of significant improvement with medications.    1. Will obtain in vitro IgE testing to seasonal and perennial aeroallergens  2. Continue cetirizine 10mg daily  3. Begin montelukast 10mg daily  4. Consider addition of fluticasone or triamcinolone nasal sprays  5. Recommend allergen immunotherapy treatment pending lab results  6. Follow-up in 3 months, sooner if needed      Millie Fenton MD  Allergy/Immunology  [place], [place]      Chart documentation done in part with Dragon Voice Recognition Software. Although reviewed after completion, some word and grammatical errors may remain.

## 2018-03-20 NOTE — MR AVS SNAPSHOT
After Visit Summary   3/20/2018    Hardy Head    MRN: [id number]           Patient Information     Date Of Birth          1990        Visit Information        Provider Department      3/20/2018 5:20 PM Millie Fenton MD [facility]        Today's Diagnoses     Cough    -  1    Other allergic rhinitis        Allergic conjunctivitis of both eyes          Care Instructions    If you have any questions regarding your allergies, asthma, or what we discussed during your visit today please call the allergy clinic or contact us via [employer].    [facility]: [phone]      I recommend that you start taking the singulair (montelukast) once a day. You can take this at the same time as the zyrtec (cetirizine) and these medicines can be taken in the morning or in the evening.      Follow-up in 3 months - sooner if you are not feeling better      These are the billing and diagnosis codes that your insurance company may need to help you determine if allergy shots are covered and what potential out of pocked costs you may have.    Regular Allergy Shots: 33486    Cluster or Rush Accelerated Build Codes: 98533 - rapid desensitization. This could be a single unit or multiple units billed per day depending on the length of your visit.    Allergy Shot Serum: 57257 - the amount of serum in total varies depending on how many allergy shots you will need    Diagnosis Codes:    Allergic Rhinitis Due to Dust Mite or Mold - J30.89  Chronic Allergic Rhinitis Due to Animals - J30.81  Chronic Seasonal Allergic Rhinitis Due to Pollens - J30.1  Allergic Conjunctivitis - H10.13        Typically allergy shots are given once per week for about 6-8 months until you reach the treatment dose. However, there are some ways you can reach your treatment dose faster:    ACCELERATED IMMUNOTHERAPY PATIENT INFORMATION    Immunotherapy is a treatment that alters the patient s immune system so they have less allergy  symptoms, use less medications to control symptoms, improved quality of life, and less health care utilization    Accelerated immunotherapy schedules are designed to allow patients to reach their maintenance immunotherapy dose in a shorter time frame than conventional immunotherapy.    Clinical benefit can be reached more rapidly using accelerated immunotherapy.     A lot of patients do not want to start allergen immunotherapy secondary to the upfront time commitment associated with conventional allergy shots. Rush immunotherapy would allow a patient to be on monthly allergy shots within 6-10 weeks. Cluster immunotherapy would allow the patient to be on monthly injections around 8-9 weeks.     Rush immunotherapy has historically been associated with an increased risk of reactions, however the risk is substantially lowered by only advancing on RUSH immunotherapy day to the mid-yellow vial, using a pre-medication regimen consisting of steroids and antihistamines, and making sure asthma is well controlled the RUSH immunotherapy day. This puts the risk of a systemic reaction similar to conventional immunotherapy. Cluster immunotherapy is similar in the risk of systemic reaction to conventional immunotherapy. The patient would still need to take oral antihistamine on cluster immunotherapy days.    CLUSTER IMMUNOTHERAPY PATIENT INSTRUCTIONS    Asthma medications must be continued and asthma must be well controlled prior to receiving CLUSTER immunotherapy. Immunotherapy should not be given if you are feeling ill.    Other allergy medications may be continued    You should plan to spend approximately 2 hours at the clinic. Please feel free to bring things to occupy your time such as books, work, or computers. You may also wish to bring something to eat as you will not be able to leave the clinic once the procedure has begun.    You will be required to bring an epinephrine auto-injector with you to your CLUSTER immunotherapy  appointments and all subsequent allergy shot appointments    You will be required to take the following pre-medication regimen prior  to your CLUSTER immunotherapy appointments  o Medications to be taken 1 day prior to CLUSTER:  - Zyrtec 10mg or Allegra 180mg twice daily  o Medications to be taken the morning of CLUSTER:  - Zyrtec 10mg or Allegra 180mg    RUS IMMUNOTHERAPY PATIENT INSTRUCTIONS    Asthma medications must be continued and asthma must be well controlled prior to starting RUSH immunotherapy. Immunotherapy should not be given if you are feeling ill.     Other allergy medications may be continued.     You should plan to spend approximately 7-8 hours at the clinic. Please feel free to bring things to occupy your time such as books, work, or computers. You may also wish to bring something to eat as you will not be able to leave the clinic once the procedure has begun.  You will be observed for 2 hours after receiving last injection on the RUS immunotherapy day.    You will be required to bring an epinephrine auto-injector with you to your RUS appointment and all subsequent allergy shot appointments.     You will be required to take the following pre-medication regimen prior to your RUSH immunotherapy appointment  o Medications to be taken 2 days prior to RUSH:  - Prednisone 20mg by mouth twice daily  - Zyrtec 10mg or Allegra 180mg twice daily.   - Singulair 10mg by mouth daily.   - Zantac 150mg by mouth twice daily.  o Medications to be taken 1 day prior to RUSH:  - Prednisone 20mg by mouth twice daily  - Zyrtec 10mg or Allegra 180mg twice daily.   - Singulair 10mg by mouth daily.   - Zantac 150mg by mouth twice daily.  o Medications to be taken the morning of RUSH:  - Prednisone 40mg by mouth once.   - Zyrtec 10mg or Allegra 180mg by mouth once.  - Singulair 10mg by mouth once.   - Zantac 300mg by mouth once.       Allergy Shots (Immunotherapy)  What You Need to Know  What are allergy shots?  Allergy  shots are used to treat allergic reactions. They are given in the upper arm.  These shots will slowly build your tolerance to the things you are allergic to (such as pollen, mold and animal dander). They reduce the body's allergic response.  What are the benefits of getting allergy shots?  Allergy shots may:    Relieve symptoms long after treatment has ended    Allow you to take less allergy medicine, or stop taking it altogether    Prevent new allergies in the future    Keep children's allergies from getting worse and turning into asthma    Improve eczema caused by allergies.  The average patient sees a large improvement in his or her symptoms (up to 80%).  Who should have allergy shots?  Allergy shots are helpful when allergies cause:    Asthma    Itchy, watery eyes (allergic conjunctivitis)    Runny nose, sneezing, sore throat and other symptoms (allergic rhinitis)    Severe reaction to insect stings.  For children, it is best to wait until age 5 before starting allergy shots.  How will I feel during and after treatment?  You will have shots every 3 to 14 days for 4 to 8 months. We will increase the dose each time until we reach a level that works for you. After that, you will have the shots less often.     It may take another year for symptoms to improve. Many people improve much sooner.    You will likely have shots for another 3 to 5 years.  Allergy shots can reduce your symptoms a little or lot. Some people find that their allergies go away entirely.   Most people have long-lasting relief after treatment ends. You should see your doctor every year to find out if you need more shots.  What are the risks?  With each allergy shot, there is the risk of allergic reaction. Some reactions are mild. Others can be life threatening and must be treated at once.   Common reactions  It is common to have a mild reaction, such as redness, swelling, pain and itching in the area of the shot. This can occur right away or up to  several hours after the shot. Sometimes the reaction moves into the upper arm. Call your doctor if:    The reaction area is more than 2 inches wide.    You still have the reaction the day after the shot.  Severe reactions  The reactions below are rare, but serious. If not treated, they can lead to very low blood pressure and even death. If you have any of these, get help at once.     Hives include a rash, swelling and itching on more than one part of the body. They may occur within minutes to hours after a shot.    Swelling of any part of the body. For example, you may have swelling of the ears, tongue, lips, throat, intestines, hands or feet. Swelling may affect more than one body part. These reactions could occur within minutes to hours after the shot.    Trouble breathing. You may develop sneezing, runny nose, stuffy nose, cough or asthma symptoms. These reactions can occur within minutes to hours after the shot.    Anaphylactic shock is sudden, severe and may include symptoms such as hives, trouble breathing, stomach pain, feeling faint or dizzy and low blood pressure. It may cause a loss of consciousness and could lead to death. This reaction usually occurs within minutes of the shot but can happen a few hours later. It is very rare.  You might have a severe reaction after the first shot, or it may occur after a series of shots. If you have a reaction, we will treat you right away. In the future, we will change the dose of your allergy shots.  What happens after each shot?  You should wait in the doctor's office for 30 minutes after each shot. If you have a reaction, we may ask you to stay longer. If you cannot wait the 30 minutes, you should not have your allergy shot.  If you have a severe reaction after leaving the doctor's office, use your epinephrine auto-injector (Epi-pen) and go to the nearest emergency room. If treated promptly, severe reactions are rarely life threatening. We will never give an allergy  shot unless medical help is nearby in case of emergency.   What else do I need to know?  If you start taking any new medicines  It is not safe to have these shots while taking certain medicines. If any doctor prescribes new medicine for you, please let us know. This includes:    Beta blockers, often used for heart disease    Blood pressure medicine    Medicine for migraine headaches    Glaucoma medicine.  A note for women  If you get pregnant, tell the office staff at once.   Your doctor will decide how often you should receive shots during pregnancy. We will not increase your dose while you are pregnant.  If you will have shots at another clinic  If you will have your allergy shots at another clinic, you must provide the name and address of the doctor who will give the shots. We will ask you to fill out a form.  If you have any questions or concerns, please speak to your doctor or a member of our staff.   For informational purposes only. Not to replace the advice of your health care provider.   Copyright   2009 [facility]. All rights reserved. i.Meter [id number] - REV 07/17.            Follow-ups after your visit        Follow-up notes from your care team     Return in about 3 months (around 6/20/2018).      Who to contact     If you have questions or need follow up information about today's clinic visit or your schedule please contact [facility] directly at [phone].  Normal or non-critical lab and imaging results will be communicated to you by MyChart, letter or phone within 4 business days after the clinic has received the results. If you do not hear from us within 7 days, please contact the clinic through MyChart or phone. If you have a critical or abnormal lab result, we will notify you by phone as soon as possible.  Submit refill requests through [employer] or call your pharmacy and they will forward the refill request to us. Please allow 3 business days for your refill to be  "completed.          Additional Information About Your Visit        MyChart Information     [employer] gives you secure access to your electronic health record. If you see a primary care provider, you can also send messages to your care team and make appointments. If you have questions, please call your primary care clinic.  If you do not have a primary care provider, please call [phone] and they will assist you.        Care EveryWhere ID     This is your Care EveryWhere ID. This could be used by other organizations to access your [place] medical records  [id number]        Your Vitals Were     Pulse Temperature Respirations Height Pulse Oximetry BMI (Body Mass Index)    75 97  F (36.1  C) (Oral) 16 1.51 m (4' 11.45\") 99% 20.69 kg/m2       Blood Pressure from Last 3 Encounters:   03/14/18 98/64   10/16/17 99/64   07/13/16 101/68    Weight from Last 3 Encounters:   03/20/18 47.2 kg (104 lb)   03/14/18 47.2 kg (104 lb)   10/16/17 47.2 kg (104 lb)              We Performed the Following     Allergen alternaria alternata IgE     Allergen car white IgE     Allergen aspergillus fumigatus IgE     Allergen cat epithellium IgE     Allergen Cedar IgE     Allergen cladosporium herbarum IgE     Allergen cottonwood IgE     Allergen D farinae IgE     Allergen D pteronyssinus IgE     Allergen dog epithelium IgE     Allergen elm IgE     Allergen English plantain IgE     Allergen Epicoccum purpurascens IgE     Allergen giant ragweed IgE     Allergen Jamal grass IgE     Allergen lamb's quarter IgE     Allergen maple box elder IgE     Allergen Mugwort IgE     Allergen oak white IgE     Allergen orchard grass IgE     Allergen penicillium notatum IgE     Allergen ragweed short IgE     Allergen Red McGregor IgE     Allergen Sagebrush Wormwood IgE     Allergen Sheep Sorrel IgE     Allergen silver  birch IgE     Allergen thistle Russian IgE     Allergen christian IgE     Allergen Tree White McGregor IgE     Allergen Henderson Tree     " Allergen Weed Nettle IgE     Allergen white pine IgE     Allergen, Kochia/Firebush     Spirometry, Breathing Capacity        Primary Care Provider Office Phone # Fax #    [facility] [phone]       [address] [address]        Equal Access to Services     ANNELISE VILLASENOR : Hadii aad ku hadasho Soomaali, waaxda luqadaha, qaybta kaalmada adeegyada, waxay idiin hayaan adeeg kharash la'aan ah. So [facility] [phone].    ATENCIÓN: Si habla español, tiene a benson disposición servicios gratuitos de asistencia lingüística. Gt al [phone].    We comply with applicable federal civil rights laws and [place] laws. We do not discriminate on the basis of race, color, national origin, age, disability, sex, sexual orientation, or gender identity.            Thank you!     Thank you for choosing [facility]  for your care. Our goal is always to provide you with excellent care. Hearing back from our patients is one way we can continue to improve our services. Please take a few minutes to complete the written survey that you may receive in the mail after your visit with us. Thank you!             Your Updated Medication List - Protect others around you: Learn how to safely use, store and throw away your medicines at www.disposemymeds.org.          This list is accurate as of 3/20/18  5:50 PM.  Always use your most recent med list.                   Brand Name Dispense Instructions for use Diagnosis    acetaminophen-caff-pyrilamine 500-60-15 MG Tabs per tablet    MIDOL MENSTRUAL     Take 2 tablets by mouth every 6 hours as needed for mild pain        COUGH DM PO           montelukast 10 MG tablet    SINGULAIR    30 tablet    Take 1 tablet (10 mg) by mouth At Bedtime    Chronic seasonal allergic rhinitis, unspecified trigger       ZYRTEC ALLERGY PO

## 2018-03-20 NOTE — Clinical Note
3/20/2018         RE: Hardy Head  [address]  [place] [address]        Dear Colleague,    Thank you for referring your patient, Hardy Head, to the [facility]. Please see a copy of my visit note below.    Dear Nirav Lundberg MD,    Thank you for referring your patient Hardy Head to the Allergy/Immunology Clinic. Hardy Head was seen in the Allergy Clinic at [facility]. The following are my recommendations regarding her {Allergydiagnoses:211023}    Hardy Head is a 27 year old female being seen today in consultation for allergies.    Having symptoms of cough, sneezing, rhinorrhea, nasal congestion, throat itching, itchy and watery eyes. These symptoms occur every spring for the past 5 or 6 years. Moved to [place] in 2014 and prior to that lived in [place] since 2010. These symptoms started when she moved to the . The first year or two did not have symptoms and then they began. Treated for bronchitis in the past but never tested for allergies. This year her symptoms seem to have started earlier. Usually takes zyrtec - last took this yesterday. The zyrtec helps a little bit. Was in [place] in early March and her symptoms were more intense, improved after coming home and have now re-started. Has tried nasal sprays in the past but doesn't recall which ones. Does use an OTC eye drop. Was prescribed singulair last week and hasn't started it yet as she has been taking zyrtec and feeling better.    Denies shortness of breath, wheezing, chest pain, or chest tightness.    FAMILY HISTORY:  No known family history of allergies or asthma    Past Medical History:   Diagnosis Date     NO ACTIVE PROBLEMS        Past Surgical History:   Procedure Laterality Date     NO HISTORY OF SURGERY         ENVIRONMENTAL HISTORY: The [place] The home is heated with a forced air. They does not have central air conditioning. The  patient's bedroom is furnished with Indoor plants and carpeting in bedroom.  Pets inside the house include None. There is not history of cockroach or mice infestation. There is/are 0 smokers in the house.  The house does not have a damp basement.     SOCIAL HISTORY:   Hardy is employed in [employer]. She has missed 3 days of school/work due to sickness. She lives with her spouse.  Her spouse works as a .    REVIEW OF SYSTEMS:  General: negative for weight gain. negative for weight loss. negative for changes in sleep.   Eyes: negative for itching. negative for redness. negative for tearing/watering.  Ears: positive  for fullness. negative for hearing loss. negative for dizziness.   Nose: positive  for snoring.negative for changes in smell. negative for drainage.   Throat: negative for hoarseness. negative for sore throat. negative for trouble swallowing.   Lungs: negative for shortness of breath.negative for wheezing. positive  for sputum production.   Cardiovascular: negative for chest pain. negative for swelling of ankles. negative for fast or irregular heartbeat.   Gastrointestinal: negative for nausea. negative for heartburn. negative for acid reflux.   Musculoskeletal: negative for joint pain. negative for joint stiffness. negative for joint swelling.   Neurologic: negative for seizures. negative for fainting. negative for weakness.   Psychiatric: positive  for changes in mood. positive  for anxiety.   Endocrine: negative for cold intolerance. negative for heat intolerance. negative for tremors.   Hematologic: negative for easy bruising. negative for easy bleeding.  Integumentary: negative for rash. negative for scaling. negative for nail changes.       Current Outpatient Prescriptions:      Dextromethorphan Polistirex (COUGH DM PO), , Disp: , Rfl:      Cetirizine HCl (ZYRTEC ALLERGY PO), , Disp: , Rfl:      montelukast (SINGULAIR) 10 MG tablet, Take 1 tablet (10 mg) by mouth At Bedtime  "(Patient not taking: Reported on 3/20/2018), Disp: 30 tablet, Rfl: 1     acetaminophen-caff-pyrilamine (MIDOL MENSTRUAL) 500-60-15 MG TABS, Take 2 tablets by mouth every 6 hours as needed for mild pain, Disp: , Rfl:   Immunization History   Administered Date(s) Administered     Influenza Vaccine IM 3yrs+ 4 Valent IIV4 10/16/2017     TDAP Vaccine (Adacel) 10/16/2017     No Known Allergies      EXAM:   Pulse 75  Temp 97  F (36.1  C) (Oral)  Resp 16  Ht 1.51 m (4' 11.45\")  Wt 47.2 kg (104 lb)  SpO2 99%  BMI 20.69 kg/m2  GENERAL APPEARANCE: alert, cooperative and not in distress  SKIN: no rashes, no lesions  HEAD: atraumatic, normocephalic  EYES: lids and lashes normal, conjunctivae and sclerae clear, pupils equal, round, reactive to light, EOM full and intact  ENT: no scars or lesions, nasal exam showed mucosal edema, otoscopy showed external auditory canals clear, tympanic membranes normal, tongue midline and normal, soft palate, uvula, and tonsils normal  NECK: no asymmetry, masses, or scars, supple without significant adenopathy  LUNGS: unlabored respirations, no intercostal retractions or accessory muscle use, clear to auscultation without rales or wheezes  HEART: regular rate and rhythm without murmurs and normal S1 and S2  MUSCULOSKELETAL: no musculoskeletal defects are noted  NEURO: no focal deficits noted  PSYCH: does not appear depressed or anxious    WORKUP: Spirometry  SPIROMETRY       FVC 2.83L (102% of predicted).     FEV1 2.53L (106% of predicted).     FEV1/FVC 89%     FEF 25%-75%  3.31L/s (100% of predicted).    These values are consistent with normal lung function without evidence of airflow obstruciton    ASSESSMENT/PLAN:  Hardy Head is a 27 year old female    ***      Millie Fenton MD  Allergy/Immunology  [facility] and [place], [place]      Chart documentation done in part with Dragon Voice Recognition Software. Although reviewed after completion, some word and grammatical errors may " remain.      Again, thank you for allowing me to participate in the care of your patient.        Sincerely,        Millie Fenton MD

## 2018-03-22 ENCOUNTER — TELEPHONE (OUTPATIENT)
Dept: ALLERGY | Facility: CLINIC | Age: 28
End: 2018-03-22

## 2018-03-22 LAB
A ALTERNATA IGE QN: <0.1 KU(A)/L
A FUMIGATUS IGE QN: <0.1 KU(A)/L
C HERBARUM IGE QN: <0.1 KU(A)/L
CAT DANDER IGG QN: 4.07 KU(A)/L
CEDAR IGE QN: <0.1 KU(A)/L
COCKSFOOT IGE QN: 0.15 KU(A)/L
COMMON RAGWEED IGE QN: 0.94 KU(A)/L
COTTONWOOD IGE QN: 0.5 KU(A)/L
D FARINAE IGE QN: 2.59 KU(A)/L
D PTERONYSS IGE QN: 2.84 KU(A)/L
DOG DANDER+EPITH IGE QN: 0.33 KU(A)/L
E PURPURASCENS IGE QN: <0.1 KU(A)/L
EAST WHITE PINE IGE QN: 0.18 KU(A)/L
ENGL PLANTAIN IGE QN: 0.19 KU(A)/L
FIREBUSH IGE QN: 0.15 KU(A)/L
GIANT RAGWEED IGE QN: 0.16 KU(A)/L
GOOSEFOOT IGE QN: 0.2 KU(A)/L
JOHNSON GRASS IGE QN: <0.1 KU(A)/L
MAPLE IGE QN: 0.52 KU(A)/L
MUGWORT IGE QN: 0.27 KU(A)/L
NETTLE IGE QN: 0.28 KU(A)/L
P NOTATUM IGE QN: <0.1 KU(A)/L
RED MULBERRY IGE QN: <0.1 KU(A)/L
SALTWORT IGE QN: 0.26 KU(A)/L
SHEEP SORREL IGE QN: 0.11 KU(A)/L
SILVER BIRCH IGE QN: 1.03 KU(A)/L
TIMOTHY IGE QN: 0.12 KU(A)/L
WHITE ASH IGE QN: 0.27 KU(A)/L
WHITE ELM IGE QN: 1.69 KU(A)/L
WHITE MULBERRY IGE QN: <0.1
WHITE OAK IGE QN: 3.96 KU(A)/L
WORMWOOD IGE QN: 0.66 KU(A)/L

## 2018-03-22 NOTE — TELEPHONE ENCOUNTER
Please call patient to discuss lab results. Tests were positive for allergies to cat, dog, dust mite, grass, and several trees and weeds. Please review dust mite avoidance measures with patient. She was counseled regarding immunotherapy at her visit and would be a good candidate for treatment. She planned to contact her insurance company to discuss coverage and if she is interested in proceeding can call the clinic to set up a time to sign consent and review any questions she may have.

## 2018-03-23 NOTE — TELEPHONE ENCOUNTER
RN left message for patient to return call to RN's direct line @ [phone] Friday, or [phone] on Monday.    Natalie Rucker RN

## 2018-03-24 LAB
CALIF WALNUT IGE QN: 1.74 KU/L
DEPRECATED MISC ALLERGEN IGE RAST QL: NORMAL

## 2018-03-26 NOTE — TELEPHONE ENCOUNTER
RN spoke with patient regarding lab results. Patient verbalized understanding. No further questions or concerns. Patient will contact allergy dept if she wishes to begin immunotherapy.        Kiarra Betancourt RN

## 2018-03-30 ENCOUNTER — OFFICE VISIT (OUTPATIENT)
Dept: DERMATOLOGY | Facility: CLINIC | Age: 28
End: 2018-03-30
Payer: COMMERCIAL

## 2018-03-30 VITALS — DIASTOLIC BLOOD PRESSURE: 66 MMHG | OXYGEN SATURATION: 98 % | HEART RATE: 96 BPM | SYSTOLIC BLOOD PRESSURE: 95 MMHG

## 2018-03-30 DIAGNOSIS — L24.9 IRRITANT DERMATITIS: Primary | ICD-10-CM

## 2018-03-30 DIAGNOSIS — L82.1 SEBORRHEIC KERATOSIS: ICD-10-CM

## 2018-03-30 DIAGNOSIS — L81.4 LENTIGO: ICD-10-CM

## 2018-03-30 DIAGNOSIS — D18.00 ANGIOMA: ICD-10-CM

## 2018-03-30 DIAGNOSIS — D22.9 NEVUS: ICD-10-CM

## 2018-03-30 DIAGNOSIS — L91.8 ACROCHORDON: ICD-10-CM

## 2018-03-30 PROCEDURE — 99204 OFFICE O/P NEW MOD 45 MIN: CPT | Performed by: PHYSICIAN ASSISTANT

## 2018-03-30 RX ORDER — DESONIDE 0.5 MG/G
CREAM TOPICAL
Qty: 60 G | Refills: 3 | Status: SHIPPED | OUTPATIENT
Start: 2018-03-30

## 2018-03-30 NOTE — NURSING NOTE
"Chief Complaint   Patient presents with     Skin Check     Rash     face for about 2-3 days        Initial BP 95/66  Pulse 96  LMP 03/05/2018  SpO2 98%  Breastfeeding? No Estimated body mass index is 20.69 kg/(m^2) as calculated from the following:    Height as of 3/20/18: 1.51 m (4' 11.45\").    Weight as of 3/20/18: 47.2 kg (104 lb).  Medication Reconciliation: complete    "

## 2018-03-30 NOTE — PATIENT INSTRUCTIONS
Apply desonide cream twice daily 1 week as needed with flare ups of irritation of face     Apply moisturizer daily - Cetaphil     Follow up in 1 month for recheck

## 2018-03-30 NOTE — MR AVS SNAPSHOT
After Visit Summary   3/30/2018    Hardy Head    MRN: [id number]           Patient Information     Date Of Birth          1990        Visit Information        Provider Department      3/30/2018 2:15 PM Tabitha Grace PA-C [facility]        Today's Diagnoses     Irritant dermatitis    -  1    Nevus        Lentigo        Angioma        Seborrheic keratosis        Acrochordon          Care Instructions    Apply desonide cream twice daily 1 week as needed with flare ups of irritation of face     Apply moisturizer daily - Cetaphil     Follow up in 1 month for recheck          Follow-ups after your visit        Follow-up notes from your care team     Return in about 4 weeks (around 4/27/2018) for recheck on rash.      Who to contact     If you have questions or need follow up information about today's clinic visit or your schedule please contact [facility] directly at [phone].  Normal or non-critical lab and imaging results will be communicated to you by MyChart, letter or phone within 4 business days after the clinic has received the results. If you do not hear from us within 7 days, please contact the clinic through [employer] or phone. If you have a critical or abnormal lab result, we will notify you by phone as soon as possible.  Submit refill requests through [employer] or call your pharmacy and they will forward the refill request to us. Please allow 3 business days for your refill to be completed.          Additional Information About Your Visit        MyChart Information     [employer] gives you secure access to your electronic health record. If you see a primary care provider, you can also send messages to your care team and make appointments. If you have questions, please call your primary care clinic.  If you do not have a primary care provider, please call [phone] and they will assist you.        Care EveryWhere ID     This is your  Care EveryWhere ID. This could be used by other organizations to access your [place] medical records  [id number]        Your Vitals Were     Pulse Last Period Pulse Oximetry Breastfeeding?          96 03/05/2018 98% No         Blood Pressure from Last 3 Encounters:   03/30/18 95/66   03/14/18 98/64   10/16/17 99/64    Weight from Last 3 Encounters:   03/20/18 47.2 kg (104 lb)   03/14/18 47.2 kg (104 lb)   10/16/17 47.2 kg (104 lb)              Today, you had the following     No orders found for display       Primary Care Provider Office Phone # Fax #    [facility] [phone] [address] [address]        Equal Access to Services     ANNELISE VILLASENOR : Hadii catina hernandez hadasho Soomaali, waaxda luqadaha, qaybta kaalmada adeegyada, behzad cavanaugh hayquinton lang . So [facility] [phone].    ATENCIÓN: Si habla español, tiene a benson disposición servicios gratuitos de asistencia lingüística. Llame al [phone].    We comply with applicable federal civil rights laws and [place] laws. We do not discriminate on the basis of race, color, national origin, age, disability, sex, sexual orientation, or gender identity.            Thank you!     Thank you for choosing [facility]  for your care. Our goal is always to provide you with excellent care. Hearing back from our patients is one way we can continue to improve our services. Please take a few minutes to complete the written survey that you may receive in the mail after your visit with us. Thank you!             Your Updated Medication List - Protect others around you: Learn how to safely use, store and throw away your medicines at www.disposemymeds.org.          This list is accurate as of 3/30/18  2:31 PM.  Always use your most recent med list.                   Brand Name Dispense Instructions for use Diagnosis    acetaminophen-caff-pyrilamine 500-60-15 MG Tabs per tablet    MIDOL MENSTRUAL      Take 2 tablets by mouth every 6 hours as needed for mild pain        COUGH DM PO           montelukast 10 MG tablet    SINGULAIR    30 tablet    Take 1 tablet (10 mg) by mouth At Bedtime    Chronic seasonal allergic rhinitis, unspecified trigger       ZYRTEC ALLERGY PO

## 2018-03-30 NOTE — LETTER
3/30/2018         RE: Hardy Head  [address]  [place] [address]        Dear Colleague,    Thank you for referring your patient, Hardy Head, to the [facility]. Please see a copy of my visit note below.    HPI:   Hardy Head is a 27 year old female who presents for Full skin cancer screening.  chief complaint  Last Skin Exam: none      1st Baseline: 3/30/2018  Personal HX of Skin Cancer: no   Personal HX of Malignant Melanoma: no   Family HX of Skin Cancer / Malignant Melanoma: n/a  Personal HX of Atypical Moles:   no  Risk factors: sun exposure  New / Changing lesions: yes on leg   Additional concern:   1. Itchy, red facial rash x 2-3 days. She has had a similar rash in the past. She used a new Origins product a couple of days ago.  2. Flaky skin of entire nose   Social History:   On review of systems, there are no further skin complaints, patient is feeling otherwise well.  See patient intake sheet.  ROS of the following were done and are negative: Constitutional, Eyes, Ears, Nose,   Mouth, Throat, Cardiovascular, Respiratory, GI, Genitourinary, Musculoskeletal,   Psychiatric, Endocrine, Allergic/Immunologic.    This document serves as a record of the services and decisions personally performed and made by Tabitha Grace, MS, PA-C. It was created on her behalf by Laura White, a trained medical scribe. The creation of this document is based on the provider's statements to the medical scribe.  Laura White 2:19 PM March 30, 2018    PHYSICAL EXAM:   BP 95/66  Pulse 96  LMP 03/05/2018  SpO2 98%  Breastfeeding? No   Skin exam performed as follows: Type 3 skin. Mood appropriate  Alert and Oriented X 3. Well developed, well nourished in no distress.  General appearance: Normal  Head including face: Normal  Eyes: conjunctiva and lids: Normal  Mouth: Lips, teeth, gums: Normal  Neck: Normal  Chest-breast/axillae: Normal  Back: Normal  Spleen and liver:  Normal  Cardiovascular: Exam of peripheral vascular system by observation for swelling, varicosities, edema: Normal  Genitalia: groin, buttocks: Normal  Extremities: digits/nails (clubbing): Normal  Eccrine and Apocrine glands: Normal  Right upper extremity: Normal  Left upper extremity: Normal  Right lower extremity: Normal  Left lower extremity: Normal  Skin: Scalp and body hair: See below    Pt deferred exam of breasts, groin, buttocks: No    Other physical findings:  1. Multiple pigmented macules on extremities and trunk  2. Multiple pigmented macules on face, trunk and extremities  3. Multiple vascular papules on trunk, arms and legs  4. Multiple scattered keratotic plaques  5. Pink, pedunculated papule on left thigh        Except as noted above, no other signs of skin cancer or melanoma.     ASSESSMENT/PLAN:   Benign Full skin cancer screening today. . Patient with history of none  Advised on monthly self exams and 1 year  Patient Education: Appropriate brochures given.    Multiple benign appearing nevi on arms, legs and trunk. Discussed ABCDEs of melanoma and sunscreen.   Multiple lentigos on arms, legs and trunk. Advised benign, no treatment needed.  Multiple scattered angiomas. Advised benign, no treatment needed.   Seborrheic keratosis on arms, legs and trunk. Advised benign, no treatment needed.  Acrochordon on left thigh - advised benign, no treatment needed. Discussed likely to continue to grow in size.   Irritant dermatitis of face - advised on condition. Discussed difficulty in determining an exact etiology, but that the most common culprits are shampoos and nail polish. Discussed gentle skin care at length; discussed OTC products that are free of allergens. Discussed allergy patch testing in clinic. Start desonide 0.05% BID PRN.     Follow-up: yearly FSE/PRN sooner     1.) Patient was asked about new and changing moles. YES  2.) Patient received a complete physical skin examination: YES  3.) Patient  was counseled to perform a monthly self skin examination: YES  Scribed By: Laura White, Medical Scribe    The information in this document, created by the medical scribe for me, accurately reflects the services I personally performed and the decisions made by me. I have reviewed and approved this document for accuracy prior to leaving the patient care area.  March 30, 2018 2:31 PM    Tabitha Grace MS, PADaronC      Again, thank you for allowing me to participate in the care of your patient.        Sincerely,        Tabitha Grace PA-C

## 2018-03-30 NOTE — PROGRESS NOTES
HPI:   Hardy Head is a 27 year old female who presents for Full skin cancer screening.  chief complaint  Last Skin Exam: none      1st Baseline: 3/30/2018  Personal HX of Skin Cancer: no   Personal HX of Malignant Melanoma: no   Family HX of Skin Cancer / Malignant Melanoma: n/a  Personal HX of Atypical Moles:   no  Risk factors: sun exposure  New / Changing lesions: yes on leg   Additional concern:   1. Itchy, red facial rash x 2-3 days. She has had a similar rash in the past. She used a new Origins product a couple of days ago.  2. Flaky skin of entire nose   Social History:   On review of systems, there are no further skin complaints, patient is feeling otherwise well.  See patient intake sheet.  ROS of the following were done and are negative: Constitutional, Eyes, Ears, Nose,   Mouth, Throat, Cardiovascular, Respiratory, GI, Genitourinary, Musculoskeletal,   Psychiatric, Endocrine, Allergic/Immunologic.    This document serves as a record of the services and decisions personally performed and made by Tabitha Grace, MS, PA-C. It was created on her behalf by Laura White, a trained medical scribe. The creation of this document is based on the provider's statements to the medical scribe.  Laura White 2:19 PM March 30, 2018    PHYSICAL EXAM:   BP 95/66  Pulse 96  LMP 03/05/2018  SpO2 98%  Breastfeeding? No   Skin exam performed as follows: Type 3 skin. Mood appropriate  Alert and Oriented X 3. Well developed, well nourished in no distress.  General appearance: Normal  Head including face: Normal  Eyes: conjunctiva and lids: Normal  Mouth: Lips, teeth, gums: Normal  Neck: Normal  Chest-breast/axillae: Normal  Back: Normal  Spleen and liver: Normal  Cardiovascular: Exam of peripheral vascular system by observation for swelling, varicosities, edema: Normal  Genitalia: groin, buttocks: Normal  Extremities: digits/nails (clubbing): Normal  Eccrine and Apocrine glands: Normal  Right upper extremity:  Normal  Left upper extremity: Normal  Right lower extremity: Normal  Left lower extremity: Normal  Skin: Scalp and body hair: See below    Pt deferred exam of breasts, groin, buttocks: No    Other physical findings:  1. Multiple pigmented macules on extremities and trunk  2. Multiple pigmented macules on face, trunk and extremities  3. Multiple vascular papules on trunk, arms and legs  4. Multiple scattered keratotic plaques  5. Pink, pedunculated papule on left thigh        Except as noted above, no other signs of skin cancer or melanoma.     ASSESSMENT/PLAN:   Benign Full skin cancer screening today. . Patient with history of none  Advised on monthly self exams and 1 year  Patient Education: Appropriate brochures given.    Multiple benign appearing nevi on arms, legs and trunk. Discussed ABCDEs of melanoma and sunscreen.   Multiple lentigos on arms, legs and trunk. Advised benign, no treatment needed.  Multiple scattered angiomas. Advised benign, no treatment needed.   Seborrheic keratosis on arms, legs and trunk. Advised benign, no treatment needed.  Acrochordon on left thigh - advised benign, no treatment needed. Discussed likely to continue to grow in size.   Irritant dermatitis of face - advised on condition. Discussed difficulty in determining an exact etiology, but that the most common culprits are shampoos and nail polish. Discussed gentle skin care at length; discussed OTC products that are free of allergens. Discussed allergy patch testing in clinic. Start desonide 0.05% BID PRN.     Follow-up: yearly FSE/PRN sooner     1.) Patient was asked about new and changing moles. YES  2.) Patient received a complete physical skin examination: YES  3.) Patient was counseled to perform a monthly self skin examination: YES  Scribed By: Laura White Medical Scribe    The information in this document, created by the medical scribe for me, accurately reflects the services I personally performed and the decisions  made by me. I have reviewed and approved this document for accuracy prior to leaving the patient care area.  March 30, 2018 2:31 PM    Tabitha Grace MS, PA-C

## 2018-04-09 PROBLEM — J30.81 CHRONIC ALLERGIC RHINITIS DUE TO ANIMAL HAIR AND DANDER: Status: ACTIVE | Noted: 2018-03-22

## 2018-04-09 PROBLEM — J30.1 CHRONIC SEASONAL ALLERGIC RHINITIS DUE TO POLLEN: Status: ACTIVE | Noted: 2018-03-22

## 2018-04-09 PROBLEM — H10.13 ALLERGIC CONJUNCTIVITIS OF BOTH EYES: Status: ACTIVE | Noted: 2018-03-22

## 2018-04-09 PROBLEM — H10.13 ALLERGIC CONJUNCTIVITIS OF BOTH EYES: Status: ACTIVE | Noted: 2018-04-09

## 2018-04-09 PROBLEM — J30.89 ALLERGIC RHINITIS DUE TO DUST MITE: Status: ACTIVE | Noted: 2018-03-22

## 2021-03-30 ENCOUNTER — IMMUNIZATION (OUTPATIENT)
Dept: PEDIATRICS | Facility: CLINIC | Age: 31
End: 2021-03-30
Payer: COMMERCIAL

## 2021-03-30 PROCEDURE — 91300 PR COVID VAC PFIZER DIL RECON 30 MCG/0.3 ML IM: CPT

## 2021-03-30 PROCEDURE — 0001A PR COVID VAC PFIZER DIL RECON 30 MCG/0.3 ML IM: CPT

## 2021-04-20 ENCOUNTER — IMMUNIZATION (OUTPATIENT)
Dept: PEDIATRICS | Facility: CLINIC | Age: 31
End: 2021-04-20
Attending: INTERNAL MEDICINE
Payer: COMMERCIAL

## 2021-04-20 PROCEDURE — 0002A PR COVID VAC PFIZER DIL RECON 30 MCG/0.3 ML IM: CPT

## 2021-04-20 PROCEDURE — 91300 PR COVID VAC PFIZER DIL RECON 30 MCG/0.3 ML IM: CPT

## 2025-03-12 ENCOUNTER — TRANSCRIBE ORDERS (OUTPATIENT)
Dept: OTHER | Age: 35
End: 2025-03-12

## 2025-03-12 DIAGNOSIS — O24.419 GESTATIONAL DIABETES: Primary | ICD-10-CM
